# Patient Record
Sex: FEMALE | Race: BLACK OR AFRICAN AMERICAN | Employment: OTHER | URBAN - METROPOLITAN AREA
[De-identification: names, ages, dates, MRNs, and addresses within clinical notes are randomized per-mention and may not be internally consistent; named-entity substitution may affect disease eponyms.]

---

## 2018-11-08 ENCOUNTER — APPOINTMENT (OUTPATIENT)
Dept: GENERAL RADIOLOGY | Age: 83
End: 2018-11-08
Payer: MEDICARE

## 2018-11-08 ENCOUNTER — HOSPITAL ENCOUNTER (EMERGENCY)
Age: 83
Discharge: HOME | End: 2018-11-08
Attending: EMERGENCY MEDICINE
Payer: MEDICARE

## 2018-11-08 VITALS
TEMPERATURE: 97.6 F | HEART RATE: 73 BPM | RESPIRATION RATE: 15 BRPM | DIASTOLIC BLOOD PRESSURE: 58 MMHG | OXYGEN SATURATION: 97 % | SYSTOLIC BLOOD PRESSURE: 120 MMHG

## 2018-11-08 DIAGNOSIS — R42 LIGHTHEADEDNESS: Primary | ICD-10-CM

## 2018-11-08 DIAGNOSIS — G56.01 CARPAL TUNNEL SYNDROME OF RIGHT WRIST: ICD-10-CM

## 2018-11-08 DIAGNOSIS — N39.0 URINARY TRACT INFECTION IN FEMALE: ICD-10-CM

## 2018-11-08 DIAGNOSIS — Z77.29 NATURAL GAS EXPOSURE: ICD-10-CM

## 2018-11-08 LAB
A/G RATIO: 1.1 (ref 1.1–2.2)
ALBUMIN SERPL-MCNC: 4.3 G/DL (ref 3.4–5)
ALP BLD-CCNC: 155 U/L (ref 40–129)
ALT SERPL-CCNC: 16 U/L (ref 10–40)
ANION GAP SERPL CALCULATED.3IONS-SCNC: 14 MMOL/L (ref 3–16)
AST SERPL-CCNC: 25 U/L (ref 15–37)
BACTERIA: ABNORMAL /HPF
BASOPHILS ABSOLUTE: 0.1 K/UL (ref 0–0.2)
BASOPHILS RELATIVE PERCENT: 0.7 %
BILIRUB SERPL-MCNC: <0.2 MG/DL (ref 0–1)
BILIRUBIN URINE: NEGATIVE
BLOOD, URINE: NEGATIVE
BUN BLDV-MCNC: 21 MG/DL (ref 7–20)
CALCIUM SERPL-MCNC: 9.3 MG/DL (ref 8.3–10.6)
CARBOXYHEMOGLOBIN: 2.7 % (ref 0–1.5)
CHLORIDE BLD-SCNC: 103 MMOL/L (ref 99–110)
CLARITY: ABNORMAL
CO2: 22 MMOL/L (ref 21–32)
COLOR: YELLOW
CREAT SERPL-MCNC: 1.2 MG/DL (ref 0.6–1.2)
EOSINOPHILS ABSOLUTE: 0.3 K/UL (ref 0–0.6)
EOSINOPHILS RELATIVE PERCENT: 2.4 %
EPITHELIAL CELLS, UA: 4 /HPF (ref 0–5)
GFR AFRICAN AMERICAN: 51
GFR NON-AFRICAN AMERICAN: 42
GLOBULIN: 4 G/DL
GLUCOSE BLD-MCNC: 163 MG/DL (ref 70–99)
GLUCOSE URINE: NEGATIVE MG/DL
HCT VFR BLD CALC: 43.1 % (ref 36–48)
HEMOGLOBIN: 13.8 G/DL (ref 12–16)
HYALINE CASTS: 3 /LPF (ref 0–8)
INR BLD: 0.97 (ref 0.86–1.14)
KETONES, URINE: NEGATIVE MG/DL
LEUKOCYTE ESTERASE, URINE: ABNORMAL
LYMPHOCYTES ABSOLUTE: 3.4 K/UL (ref 1–5.1)
LYMPHOCYTES RELATIVE PERCENT: 32.8 %
MCH RBC QN AUTO: 26.9 PG (ref 26–34)
MCHC RBC AUTO-ENTMCNC: 32 G/DL (ref 31–36)
MCV RBC AUTO: 83.9 FL (ref 80–100)
MICROSCOPIC EXAMINATION: YES
MONOCYTES ABSOLUTE: 0.9 K/UL (ref 0–1.3)
MONOCYTES RELATIVE PERCENT: 9.2 %
NEUTROPHILS ABSOLUTE: 5.7 K/UL (ref 1.7–7.7)
NEUTROPHILS RELATIVE PERCENT: 54.9 %
NITRITE, URINE: NEGATIVE
PDW BLD-RTO: 14.8 % (ref 12.4–15.4)
PH UA: 5.5
PLATELET # BLD: 226 K/UL (ref 135–450)
PMV BLD AUTO: 9.2 FL (ref 5–10.5)
POTASSIUM REFLEX MAGNESIUM: 4.6 MMOL/L (ref 3.5–5.1)
PROTEIN UA: NEGATIVE MG/DL
PROTHROMBIN TIME: 11.1 SEC (ref 9.8–13)
RBC # BLD: 5.14 M/UL (ref 4–5.2)
RBC UA: 2 /HPF (ref 0–4)
SODIUM BLD-SCNC: 139 MMOL/L (ref 136–145)
SPECIFIC GRAVITY UA: 1.01
TOTAL PROTEIN: 8.3 G/DL (ref 6.4–8.2)
TROPONIN: <0.01 NG/ML
URINE REFLEX TO CULTURE: YES
URINE TYPE: ABNORMAL
UROBILINOGEN, URINE: 0.2 E.U./DL
WBC # BLD: 10.3 K/UL (ref 4–11)
WBC UA: 16 /HPF (ref 0–5)

## 2018-11-08 PROCEDURE — 87086 URINE CULTURE/COLONY COUNT: CPT

## 2018-11-08 PROCEDURE — 2580000003 HC RX 258: Performed by: EMERGENCY MEDICINE

## 2018-11-08 PROCEDURE — 85025 COMPLETE CBC W/AUTO DIFF WBC: CPT

## 2018-11-08 PROCEDURE — 93005 ELECTROCARDIOGRAM TRACING: CPT | Performed by: EMERGENCY MEDICINE

## 2018-11-08 PROCEDURE — 81001 URINALYSIS AUTO W/SCOPE: CPT

## 2018-11-08 PROCEDURE — 85610 PROTHROMBIN TIME: CPT

## 2018-11-08 PROCEDURE — 96360 HYDRATION IV INFUSION INIT: CPT

## 2018-11-08 PROCEDURE — 71045 X-RAY EXAM CHEST 1 VIEW: CPT

## 2018-11-08 PROCEDURE — 84484 ASSAY OF TROPONIN QUANT: CPT

## 2018-11-08 PROCEDURE — 82375 ASSAY CARBOXYHB QUANT: CPT

## 2018-11-08 PROCEDURE — 80053 COMPREHEN METABOLIC PANEL: CPT

## 2018-11-08 PROCEDURE — 99284 EMERGENCY DEPT VISIT MOD MDM: CPT

## 2018-11-08 RX ORDER — 0.9 % SODIUM CHLORIDE 0.9 %
500 INTRAVENOUS SOLUTION INTRAVENOUS ONCE
Status: COMPLETED | OUTPATIENT
Start: 2018-11-08 | End: 2018-11-08

## 2018-11-08 RX ORDER — CEPHALEXIN 500 MG/1
500 CAPSULE ORAL 3 TIMES DAILY
Qty: 21 CAPSULE | Refills: 0 | Status: SHIPPED | OUTPATIENT
Start: 2018-11-08 | End: 2018-11-15

## 2018-11-08 RX ADMIN — SODIUM CHLORIDE 500 ML: 9 INJECTION, SOLUTION INTRAVENOUS at 17:18

## 2018-11-08 NOTE — ED PROVIDER NOTES
tracheal deviation present. Cardiovascular: Normal rate, regular rhythm, normal heart sounds and intact distal pulses. Exam reveals no gallop and no friction rub. No murmur heard. Pulmonary/Chest: Effort normal and breath sounds normal. No respiratory distress. She has no wheezes. She has no rales. She exhibits no tenderness. Abdominal: Soft. She exhibits no distension. There is no tenderness. Musculoskeletal: Normal range of motion. She exhibits no edema, tenderness or deformity. 4/5  strength on the right compared to the left which is 5/5. Slightly decreased sensation for 2 fingers of the right compared to left. Neurological: She is alert and oriented to person, place, and time. No cranial nerve deficit. She exhibits normal muscle tone. Coordination normal.   Skin: Skin is warm and dry. No rash noted. She is not diaphoretic. No erythema. No pallor. Psychiatric: She has a normal mood and affect. Her behavior is normal. Judgment and thought content normal.       DIAGNOSTIC RESULTS     EKG (Per Emergency Physician): EKG interpretation by ED physician: Left axis deviation, sinus rhythm at 98 bpm.  No acute ST changes appreciated    RADIOLOGY (Per Emergency Physician): Interpretation per the Radiologist below, if available at the time ofthis note:  Xr Chest Portable    Result Date: 11/8/2018  EXAMINATION: [address]   [address] CHEST 11/8/2018 3:51 pm COMPARISON: 01/16/2008 HISTORY: ORDERING SYSTEM PROVIDED HISTORY:  TECHNOLOGIST PROVIDED HISTORY: Reason for exam:->cp Ordering Physician Provided Reason for Exam: chest discomfort Acuity: Acute Type of Exam: Initial FINDINGS: Cardiac silhouette is normal.  There is moderate calcification of the thoracic aorta. Hilar contours are normal.  There is no focal airspace disease. No acute cardiopulmonary disease is identified.        LABS:  Labs Reviewed   COMPREHENSIVE METABOLIC PANEL W/ REFLEX TO MG FOR LOW K - Abnormal; Notable for the 1815 11/08/18 1830 11/08/18 1845   BP: 119/61 122/60 (!) 119/59 (!) 120/58   Pulse:    73   Resp:    15   Temp:       TempSrc:       SpO2: 100% 100%  97%       Medications   0.9 % sodium chloride bolus (0 mLs Intravenous Stopped 11/8/18 1816)       MDM. Cardiac workup initiated as well as carboxyhemoglobin level, urinalysis and IV fluid bolus. Meaghan Collier, pt's RN reportedly spoke with [employer] who tested her house for [address] poisoning and said that her house was clear. Note that she has only minimal elevation of her carboxyhemoglobin. This is not high enough to be considered, monoxide poisoning. The rest of the blood work is unremarkable. She'll be discharged home with strict return precautions. Her urine is concerning for poss UTI. This'll be cx'd and abx to be initiated. Note that she is displaying signs and symptoms of possible carpal tunnel syndrome social be given a wrist splint and instructed to follow up with orthopedics. Patient instructed to follow up with their primary care doctor in one-two days and return to the emergency department if worsening of the condition or any other concerns. Please see discharge instructions for further delineation regarding the specific discharge instructions explained and given to the patient. CONSULTS:  None    PROCEDURES:  Unless otherwise noted below, none     Procedures    FINAL IMPRESSION      1. Lightheadedness    2. Natural gas exposure    3. Urinary tract infection in female    4.  Carpal tunnel syndrome of right wrist          DISPOSITION/PLAN   DISPOSITION Decision To Discharge 11/08/2018 05:42:06 PM      PATIENT REFERRED TO:  [address] [place] [address] Drive  [address] [facility], [facility] [place] [address] [phone]    Call in 2 days      Evan Yo MD  [address] [facility] [address]  [phone]    Call in 2 days      [facility] Emergency Department  [address] [facility]  [address] [place] [id number] [phone]    If

## 2018-11-08 NOTE — ED NOTES
Bed: 26  Expected date:   Expected time:   Means of arrival: [facility]  Comments:  [id number], DIZZY, GAS SMELL IN HOUSE     Mikhail Valenzuela RN  11/08/18 3848

## 2018-11-09 PROCEDURE — 93010 ELECTROCARDIOGRAM REPORT: CPT | Performed by: INTERNAL MEDICINE

## 2018-11-10 LAB — URINE CULTURE, ROUTINE: NORMAL

## 2018-11-16 LAB
EKG ATRIAL RATE: 98 BPM
EKG DIAGNOSIS: NORMAL
EKG P AXIS: 61 DEGREES
EKG P-R INTERVAL: 142 MS
EKG Q-T INTERVAL: 354 MS
EKG QRS DURATION: 80 MS
EKG QTC CALCULATION (BAZETT): 451 MS
EKG R AXIS: -39 DEGREES
EKG T AXIS: 52 DEGREES
EKG VENTRICULAR RATE: 98 BPM

## 2020-02-17 ENCOUNTER — HOSPITAL ENCOUNTER (OUTPATIENT)
Dept: PHYSICAL THERAPY | Age: 85
Setting detail: THERAPIES SERIES
Discharge: HOME | End: 2020-02-17
Payer: MEDICARE

## 2020-02-17 PROCEDURE — 97110 THERAPEUTIC EXERCISES: CPT

## 2020-02-17 PROCEDURE — 97161 PT EVAL LOW COMPLEX 20 MIN: CPT

## 2020-02-17 NOTE — PLAN OF CARE
and sitting up in bed is the only thing that relieves the pain. Relevant Medical History: HTN   Functional Outcome: Q-dash 50% impaired - assessed 2/17     Pain Scale: 8/10  Easing factors: Sitting   Provocative factors: Laying down to sleep, cooking     Type: [x]Constant   []Intermittent  [x]Radiating []Localized []other:     Numbness/Tingling: Lateral three fingers on R      Occupation/School: Retired   Living Status/Prior Level of Function: Prior to this incident, pt was independent with ADLs and IADLs, she lived alone and was independent with housework including cooking, laundry, and cleaning. Her son checks on her about 3 times a week.        OBJECTIVE:     Hand dominance: R    Palpation: increased tissue tension in B UT     Functional Mobility/Transfers: Independent with occasional use of cane     Posture: Protracted shoulders, slight forward head     Bandages/Dressings/Incisions: none     ULTT:   Median: + with sensitizing maneuver   Radial: -         Dermatomes Normal Abnormal Comments   Top of head (C1)      Posterior occipital region (C2)      Side of neck (C3) *     Top of shoulder (C4) *     Lateral deltoid (C5) *     Tip of thumb (C6) *     Distal middle finger (C7) *     Distal fifth finger (C8) *     Medial forearm (T1) *            PROM AROM    L R L R   Cervical Flexion    43    Cervical Extension    22 (pain)    Cervical Rotation   43 (pain)  37 (pain)    Cervical Side-bend   24 (pain)  23 (pain)    Shoulder Flexion    140 144   Shoulder Abduction    126 128 Pain increases at ~60 deg   Shoulder External Rotation    60 75 (pain)   Shoulder Internal Rotation    40 45   Elbow Flexion        Elbow Extension        Pronation        Supination        Wrist Flexion        Wrist Extension        Radial Deviation        Ulnar Deviation            Strength (0-5) Left Right    Shoulder Shrug (C4)     Shoulder Flex 4+/5 4-/5 (pain)    Shoulder Abd (C5) 5/5 4/5 (pain)    Shoulder ER 5/5 4+/5   Shoulder IR 5/5 4+/5   Biceps (C6) 5/5 5/5   Triceps (C7) 5/5 5/5   Lats     Middle Trap     Rhomboids 5/5 5/5   Lower Trap      Pronation      Supination      Wrist Flex (C7) 5/5 3+/5   Wrist Ext (C6) 5/5 4+/5   Wrist Radial Deviation     Wrist Ulnar Deviation                    Joint mobility:    []Normal    []Hypo   [x]Hyper      Orthopaedic Special Tests Positive  Negative  NT Comments    Shoulder        Neer        Thaddeus[place]Cal[place] *   Pain in anterior/superioer shoudler    Empty Can *   Pain in anterior/supererior shoulder    Drop Arm       [place]'s       Speeds        Sulcus        Apprehension (Anterior) *      Load and Shift       Cervical compression   *     Cervical distraction   *     Spurling's   *            Elbow        Cozen's       Varus Stress       Valgus Stress        Tinel's                                                   [x] Patient history, allergies, meds reviewed. Medical chart reviewed. See intake form. Review Of Systems (ROS):  [x]Performed Review of systems (Integumentary, CardioPulmonary, Neurological) by intake and observation. Intake form has been scanned into medical record. Patient has been instructed to contact their primary care physician regarding ROS issues if not already being addressed at this time.        Co-morbidities/Complexities (which will affect course of rehabilitation):   []None           Arthritic conditions   []Rheumatoid arthritis (M05.9)  []Osteoarthritis (M19.91)   Cardiovascular conditions   [x]Hypertension (I10)  []Hyperlipidemia (E78.5)  []Angina pectoris (I20)  []Atherosclerosis (I70)   Musculoskeletal conditions   []Disc pathology   []Congenital spine pathologies   []Prior surgical intervention  []Osteoporosis (M81.8)  []Osteopenia (M85.8)   Endocrine conditions   []Hypothyroid (E03.9)  []Hyperthyroid Gastrointestinal conditions   []Constipation (L82.85)   Metabolic conditions   []Morbid obesity (E66.01)  []Diabetes type 1(E10.65) or 2 (E11.65)   []Neuropathy object   []other:    Participation Restrictions   [x]Reduced participation in self care activities   [x]Reduced participation in home management activities   []Reduced participation in work activities   []Reduced participation in social activities. [x]Reduced participation in sport/recreation activities - crocheting     Classification:   []Signs/symptoms consistent with post-surgical status including decreased ROM, strength and function.   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     Prognosis/Rehab Potential:      []Excellent   []Good    [x]Fair   []Poor    Tolerance of evaluation/treatment:    []Excellent   []Good    [x]Fair   []Poor    Physical Therapy Evaluation Complexity Justification  [x] A history of present problem with:  [x] no personal factors and/or comorbidities that impact the plan of care;  []1-2 personal factors and/or comorbidities that impact the plan of care  []3 personal factors and/or comorbidities that impact the plan of care  [x] An examination of body systems using standardized tests and measures addressing any of the following: body structures and functions (impairments), activity limitations, and/or participation restrictions;:  [] a total of 1-2 or more elements   [x] a total of 3 or more elements   [] a total of 4 or more elements   [x] A clinical presentation with:  [x] stable and/or uncomplicated characteristics   [] evolving clinical presentation with changing characteristics  [] unstable and unpredictable characteristics;   [x] Clinical decision making of [x] low, [] moderate, [] high complexity using standardized patient assessment instrument and/or measurable assessment of functional outcome. [x] EVAL (LOW) 98032 (typically 15 minutes face-to-face)  [] EVAL (MOD) 69591 (typically 30 minutes face-to-face)  [] EVAL (HIGH) 01324 (typically 45 minutes face-to-face)  [] RE-EVAL     PLAN:  Frequency/Duration:  2 days per week for 5 Weeks:  INTERVENTIONS:  [x] Therapeutic exercise including: strength training, ROM, for Upper extremity and core   [x]  NMR activation and proprioception for UE, scap and Core   [x] Manual therapy as indicated for shoulder, scapula and spine to include: Dry Needling/IASTM, STM, PROM, Gr I-IV mobilizations, manipulation. [x] Modalities as needed that may include: thermal agents, E-stim, Biofeedback, US, iontophoresis as indicated  [x] Patient education on joint protection, postural re-education, activity modification, progression of HEP. HEP instruction: Written HEP instructions provided and reviewed (see scanned image in ). UT stretch, median nerve glides, scap squeeze     GOALS:  Patient stated goal: to decrease pain   [] Progressing: [] Met: [] Not Met: [] Adjusted    Therapist goals for Patient:   Short Term Goals: To be achieved in: 2 weeks  1. Independent in HEP and progression per patient tolerance, in order to prevent re-injury. [] Progressing: [] Met: [] Not Met: [] Adjusted  2. Patient will have a decrease in pain to facilitate improvement in movement, function, and ADLs as indicated by Functional Deficits. [] Progressing: [] Met: [] Not Met: [] Adjusted    Long Term Goals: To be achieved in: 5 weeks  1. Disability index score of 10% or less for the UEFI or Quick DASH to assist with reaching prior level of function. [] Progressing: [] Met: [] Not Met: [] Adjusted  2.  Patient

## 2020-02-17 NOTE — FLOWSHEET NOTE
[address] [facility]  Phone: [phone]   Fax: [phone]    Physical Therapy Daily Treatment Note  Date:  2020    Patient Name:  Ina Arana    :  1925  MRN: [id number]  Medical/Treatment Diagnosis Information:  Diagnosis: Muscle wasting and atrophy, not elsewhere classified, right shoulder  Treatment Diagnosis: Decreased cervical ROM, increase tissue tension in R UT, decreased strength in R UE, impaired posture   Insurance/Certification information:  PT Insurance Information: [employer] (med nec-$40 copay)   Physician Information:  Referring Practitioner: [address] [place] [address] signed (Y/N): []  Yes [x]  No Sent      Date of Patient follow up with Physician:      Progress Report: [x]  Yes  []  No     Date Range for reporting period:  Beginnin20  Ending    Progress report due (10 Rx/or 30 days whichever is less): 57GR visit     Recertification due (POC duration/ or 90 days whichever is less): 40 days     Visit # Insurance Allowable Auth required?  Date Range   1/10  Med nec  []  Yes  [x]  No n/a     Latex Allergy:  [x]NO      []YES  Preferred Language for Healthcare:   [x]English       []other:    Functional Scale:  Date assessed:    Pain level:  8/10     SUBJECTIVE:  See eval    OBJECTIVE: See eval      RESTRICTIONS/PRECAUTIONS: [place]     Exercises/Interventions:     Therapeutic Exercises (10686) Resistance / level Sets/sec Reps Notes   Scap squeeze   3 sec X 10  HEP    Median nerve glides    X 10  HEP   UT stretch   20 sec  X 2 B  HEP                                             Therapeutic Activities (68620)                                          Neuromuscular Re-ed (00558)                                                 Manual Intervention (66200)       STM to B UT's   5 min   Increased pain with pressure to R UT                                           Pt. Education:  -patient educated on diagnosis, reducing/eliminating soft tissue swelling/inflammation/restriction, improving soft tissue extensibility and allowing for proper ROM for normal function with   [] LE / lumbar: self care, mobility, lifting and ambulation. [] UE / Cervical: self care, reaching, carrying, lifting, house/yardwork, driving, computer work. Modalities:  [] (19586) Vasopneumatic compression: Utilized vasopneumatic compression to decrease edema / swelling for the purpose of improving mobility and quad tone / recruitment which will allow for increased overall function including but not limited to self-care, transfers, ambulation, and ascending / descending stairs. Modalities:      Charges:  Timed Code Treatment Minutes: 45   Total Treatment Minutes: 60     [x] EVAL - LOW (67459)   [] EVAL - MOD (30263)  [] EVAL - HIGH (41465)  [] RE-EVAL (93572)  [x] AG(31314) x   3    [] Ionto  [] NMR (03265) x       [] Vaso  [] Manual (16787) x       [] Ultrasound  [] TA x        [] Mech Traction (60481)  [] Aquatic Therapy x     [] ES (un) (71119):   [] Home Management Training x  [] ES(attended) (12375)   [] Dry Needling 1-2 muscles (39982):  [] Dry Needling 3+ muscles (000966  [] Group:      [] Other:     GOALS:   Short Term Goals: To be achieved in: 2 weeks  1. Independent in HEP and progression per patient tolerance, in order to prevent re-injury. [] Progressing: [] Met: [] Not Met: [] Adjusted  2. Patient will have a decrease in pain to facilitate improvement in movement, function, and ADLs as indicated by Functional Deficits. [] Progressing: [] Met: [] Not Met: [] Adjusted    Long Term Goals: To be achieved in: 5 weeks  1. Disability index score of 10% or less for the UEFI or Quick DASH to assist with reaching prior level of function. [] Progressing: [] Met: [] Not Met: [] Adjusted  2.  Patient will demonstrate increased cervical AROM to 30 deg extension with equal side bending and rotation without pain  to allow for proper joint functioning during housework activities. [] Progressing: [] Met: [] Not Met: [] Adjusted  3. Patient will demonstrate an increase in R UE strength to 5/5 in shoulder and wrist to allow her to cook a full meal without fatigue or an increase in symptoms. [] Progressing: [] Met: [] Not Met: [] Adjusted  4. Patient will return to functional activities including crocheting and hosuework without increased symptoms or restriction. [] Progressing: [] Met: [] Not Met: [] Adjusted     Overall Progression Towards Functional goals/ Treatment Progress Update:  [] Patient is progressing as expected towards functional goals listed. [] Progression is slowed due to complexities/Impairments listed. [] Progression has been slowed due to co-morbidities. [x] Plan just implemented, too soon to assess goals progression <30days   [] Goals require adjustment due to lack of progress  [] Patient is not progressing as expected and requires additional follow up with physician  [] Other    Persisting Functional Limitations/Impairments:  [x]Sleeping []Sitting               []Standing []Transfers        []Walking []Kneeling               []Stairs []Squatting / bending   [x]ADLs [x]Reaching  [x]Lifting  [x]Housework  []Driving []Job related tasks  [x]Sports/Recreation []Other:        ASSESSMENT:  See eval  Treatment/Activity Tolerance:  [x] Patient able to complete tx [] Patient limited by fatigue  [] Patient limited by pain  [] Patient limited by other medical complications  [] Other:     Prognosis: [] Good [x] Fair  [] Poor    Patient Requires Follow-up: [x] Yes  [] No    Plan for next treatment session:   Scapulothoracic strengthening, median nerve glides, UT stretching and STM, cervical ROM    PLAN: See eval. PT 2x / week for 5 weeks.    [] Continue per plan of care [] Alter current plan (see comments)  [x] Plan of care initiated [] Hold pending MD visit [] Discharge    Electronically signed by:CONSTANTIN Quesada    Therapist was

## 2020-02-19 ENCOUNTER — APPOINTMENT (OUTPATIENT)
Dept: PHYSICAL THERAPY | Age: 85
End: 2020-02-19
Payer: MEDICARE

## 2020-02-26 ENCOUNTER — APPOINTMENT (OUTPATIENT)
Dept: PHYSICAL THERAPY | Age: 85
End: 2020-02-26
Payer: MEDICARE

## 2020-02-29 ENCOUNTER — APPOINTMENT (OUTPATIENT)
Dept: PHYSICAL THERAPY | Age: 85
End: 2020-02-29
Payer: MEDICARE

## 2023-11-10 ENCOUNTER — PROCEDURE VISIT (OUTPATIENT)
Dept: AUDIOLOGY | Age: 88
End: 2023-11-10

## 2023-11-10 ENCOUNTER — OFFICE VISIT (OUTPATIENT)
Dept: ENT CLINIC | Age: 88
End: 2023-11-10
Payer: MEDICARE

## 2023-11-10 VITALS
SYSTOLIC BLOOD PRESSURE: 138 MMHG | HEART RATE: 92 BPM | WEIGHT: 153 LBS | TEMPERATURE: 97.1 F | OXYGEN SATURATION: 95 % | BODY MASS INDEX: 28.89 KG/M2 | HEIGHT: 61 IN | DIASTOLIC BLOOD PRESSURE: 71 MMHG

## 2023-11-10 DIAGNOSIS — H90.3 ASYMMETRIC SNHL (SENSORINEURAL HEARING LOSS): ICD-10-CM

## 2023-11-10 DIAGNOSIS — H90.A22 SENSORINEURAL HEARING LOSS (SNHL) OF LEFT EAR WITH RESTRICTED HEARING OF RIGHT EAR: Primary | Chronic | ICD-10-CM

## 2023-11-10 DIAGNOSIS — H90.A21 SENSORINEURAL HEARING LOSS (SNHL) OF RIGHT EAR WITH RESTRICTED HEARING OF LEFT EAR: Primary | ICD-10-CM

## 2023-11-10 DIAGNOSIS — H90.A32 MIXED CONDUCTIVE AND SENSORINEURAL HEARING LOSS OF LEFT EAR WITH RESTRICTED HEARING OF RIGHT EAR: ICD-10-CM

## 2023-11-10 DIAGNOSIS — H93.13 SUBJECTIVE TINNITUS OF BOTH EARS: Chronic | ICD-10-CM

## 2023-11-10 DIAGNOSIS — H90.A31 MIXED CONDUCTIVE AND SENSORINEURAL HEARING LOSS OF RIGHT EAR WITH RESTRICTED HEARING OF LEFT EAR: Chronic | ICD-10-CM

## 2023-11-10 PROCEDURE — G8484 FLU IMMUNIZE NO ADMIN: HCPCS | Performed by: OTOLARYNGOLOGY

## 2023-11-10 PROCEDURE — 1036F TOBACCO NON-USER: CPT | Performed by: OTOLARYNGOLOGY

## 2023-11-10 PROCEDURE — 1090F PRES/ABSN URINE INCON ASSESS: CPT | Performed by: OTOLARYNGOLOGY

## 2023-11-10 PROCEDURE — G8419 CALC BMI OUT NRM PARAM NOF/U: HCPCS | Performed by: OTOLARYNGOLOGY

## 2023-11-10 PROCEDURE — G8427 DOCREV CUR MEDS BY ELIG CLIN: HCPCS | Performed by: OTOLARYNGOLOGY

## 2023-11-10 PROCEDURE — 1123F ACP DISCUSS/DSCN MKR DOCD: CPT | Performed by: OTOLARYNGOLOGY

## 2023-11-10 PROCEDURE — 99203 OFFICE O/P NEW LOW 30 MIN: CPT | Performed by: OTOLARYNGOLOGY

## 2023-11-10 RX ORDER — AMLODIPINE BESYLATE 10 MG/1
1 TABLET ORAL DAILY
COMMUNITY
Start: 2023-10-20

## 2023-11-10 RX ORDER — LOSARTAN POTASSIUM 100 MG/1
100 TABLET ORAL DAILY
COMMUNITY
Start: 2023-08-22

## 2023-11-10 RX ORDER — ATORVASTATIN CALCIUM 40 MG/1
1 TABLET, FILM COATED ORAL DAILY
COMMUNITY
Start: 2023-10-20

## 2023-11-10 RX ORDER — ZINC OXIDE 20 %
OINTMENT (GRAM) TOPICAL
COMMUNITY
Start: 2023-08-16

## 2023-11-10 ASSESSMENT — ENCOUNTER SYMPTOMS
RHINORRHEA: 0
SINUS PAIN: 0
SORE THROAT: 0

## 2023-11-10 NOTE — PROGRESS NOTES
Cole Ferrera   6/12/1925, 80 y.o. female   [id number]       Referring Provider: Pedro Thompson   Referral Type: Scanned into chart    Reason for Visit: Evaluation of suspected change in hearing, tinnitus, or balance. ADULT AUDIOLOGIC EVALUATION      Cole Ferrera is a 80 y.o. female seen today, 11/10/2023 , for an initial audiologic evaluation. AUDIOLOGIC AND OTHER PERTINENT MEDICAL HISTORY:      Cole Ferrera was referred by her PCP for a change in hearing in the right ear. She noted that she has had long-standing hearing loss in the left ear. She noted that the change in hearing occurred about 2-3 months ago. She reported occasional imbalance. Case history unclear as patient had trouble remembering. Date: 11/10/2023     IMPRESSIONS:      Today's results revealed a moderate to moderately-severe sensorineural hearing loss in the right ear and a severe rising to moderately-severe mixed hearing loss in the left ear. Unsure if left ear bone conduction responses were vibrotactile or if the hearing loss is truly mixed. Word recognition could not be tested due to patient getting confused with the task. Tympanometry indicates normal middle ear function. Discussed test results and implications with patient. Discussed benefits of amplification pending medical clearance. Follow medical recommendations of Thai Cisneros MD.     ASSESSMENT AND FINDINGS:     Otoscopy unremarkable. RIGHT EAR:  Hearing Sensitivity: Moderate to moderately-severe sensorineural hearing loss   Speech Recognition Threshold: 65  Word Recognition: Could not test; patient was very confused and could not follow the task   Tympanometry: Normal peak pressure and compliance, Type A tympanogram, consistent with normal middle ear function. LEFT EAR:  Hearing Sensitivity: Severe rising to moderately-severe mixed hearing loss. Unsure if bone conduction responses were vibrotactile or if it is a true mixed hearing loss.   Speech

## 2023-11-10 NOTE — PATIENT INSTRUCTIONS
You should consider amplification therapy, hearing aid, if you are having difficulty communicating and/or hearing important sounds. I recommend bilateral hearing aids for aural rehabilitation and to aid in restoring functional hearing. You may follow up with the [facility] audiologist or with another hearing aid provider of your choice. You should return for an annual hearing test to monitor your hearing, and whenever your hearing further decreases significantly. You should return if your ears plug up with ear wax causing difficulty hearing or pressure. Most patients who use hearing aids, will need their ears cleaned every 6 to 12 months. You should employ the tinnitus masking techniques and strategies we discussed, as needed. Bedside tinnitus masking devices (eg. a white noise machine, noise machine, noise claritza on your cell phone, fan on in the room, radio with light music or tuned between stations to white noise static) can be very helpful. You should avoid exposure to excessively high levels of noise/sound and use hearing protection measures we discussed, as needed, if such exposure is unavoidable. NO Q-TIPS IN THE EARS  You should never clean your ears with a Q-tip, cotton tipped applicator, Adele pin, paper clip, or any other instrument. I recommend only use of one the several ear wax removal kits available \"over the counter\" (eg. [employer] or [employer]ine, or [employer]) if you feel a need to try to remove ear wax. No other methods should be self used for this purpose as there is danger of injury to the ear and risk of irreparable and irreversible permanent hearing loss.

## 2023-11-10 NOTE — PROGRESS NOTES
and soft palates, uvula, tongue, posterior oropharyngeal wall, mucosa and secretions appeared to be normal.     Salivary Glands:  Normal bilateral parotid and bilateral submandibular salivary glands. Neck:  No masses or tenderness. Trachea midline. Laryngeal cartilages and hyoid bone normal.  Normal laryngeal crepitus. Thyroid:  Normal, nontender, no goiter or nodules palpable. Lymph nodes:  No cervical lymphadenopathy. AUDIOGRAM (today):  I reviewed the results of the audiogram, showing right moderate sensorineural and left severe mixed asymmetric, pan frequency hearing loss. COUNSELING    Audiogram results were reviewed and discussed with [facility]. I advised of type and severity of hearing loss and the probable etiology of hearing loss of aging (presbycusis), with superimposed conductive hearing loss on the left. I discussed the possible associated impairment. I advised of the need for avoidance of prolonged or frequent exposure to excessive noise and the need for noise protection, if such exposure is unavoidable. I discussed the possible benefits of hearing aids, or other amplification therapy. I discussed the possible etiology of tinnitus and treatment/coping measures including masking techniques. I advised of the need for annual and prn hearing tests. Patient was advised of the greater decrease in word and speech understanding in the presence of background noise and of the expected difficulty understanding speech in the presence of moderate to high volume background noise associated with this hearing loss. Mounika Gadrner / Carmen Hightower / Jasmin Vaughn was seen today for hearing loss.     Diagnoses and all orders for this visit:    Sensorineural hearing loss (SNHL) of left ear with restricted hearing of right ear  -     [facility]    Mixed conductive and sensorineural hearing loss of right ear with restricted hearing of left ear  -     [facility]